# Patient Record
Sex: FEMALE | Race: BLACK OR AFRICAN AMERICAN | NOT HISPANIC OR LATINO | Employment: UNEMPLOYED | URBAN - METROPOLITAN AREA
[De-identification: names, ages, dates, MRNs, and addresses within clinical notes are randomized per-mention and may not be internally consistent; named-entity substitution may affect disease eponyms.]

---

## 2017-02-07 ENCOUNTER — TELEPHONE (OUTPATIENT)
Dept: OBSTETRICS AND GYNECOLOGY | Facility: CLINIC | Age: 22
End: 2017-02-07

## 2017-02-07 NOTE — TELEPHONE ENCOUNTER
----- Message from Lisseth Adams sent at 2/7/2017  1:03 PM CST -----  Contact: pt   _X  1st Request  _  2nd Request  _  3rd Request        Who: JUDI WORLEY [[id number]]  Why: pt is needing to schedule initial OB appt pt LMP 9/13/16     What Number to Call Back: [phone]    When to Expect a call back: (Before the end of the day)   -- if the call is after 12:00, the call back will be tomorrow.

## 2017-02-07 NOTE — TELEPHONE ENCOUNTER
Attempted to contact patient to schedule new ob appointment. There was a dial tone and automatic message, so unable to leave message